# Patient Record
Sex: FEMALE | Race: WHITE | NOT HISPANIC OR LATINO | Employment: FULL TIME | URBAN - METROPOLITAN AREA
[De-identification: names, ages, dates, MRNs, and addresses within clinical notes are randomized per-mention and may not be internally consistent; named-entity substitution may affect disease eponyms.]

---

## 2017-02-02 ENCOUNTER — ALLSCRIPTS OFFICE VISIT (OUTPATIENT)
Dept: OTHER | Facility: OTHER | Age: 27
End: 2017-02-02

## 2017-06-30 ENCOUNTER — ALLSCRIPTS OFFICE VISIT (OUTPATIENT)
Dept: OTHER | Facility: OTHER | Age: 27
End: 2017-06-30

## 2017-07-21 ENCOUNTER — HOSPITAL ENCOUNTER (EMERGENCY)
Facility: HOSPITAL | Age: 27
Discharge: HOME | End: 2017-07-21
Admitting: EMERGENCY MEDICINE
Payer: COMMERCIAL

## 2017-07-21 ENCOUNTER — GENERIC CONVERSION - ENCOUNTER (OUTPATIENT)
Dept: OTHER | Facility: OTHER | Age: 27
End: 2017-07-21

## 2017-07-21 VITALS
DIASTOLIC BLOOD PRESSURE: 59 MMHG | SYSTOLIC BLOOD PRESSURE: 118 MMHG | WEIGHT: 124 LBS | RESPIRATION RATE: 18 BRPM | HEART RATE: 53 BPM | TEMPERATURE: 97.9 F | OXYGEN SATURATION: 100 %

## 2017-07-21 DIAGNOSIS — T19.2XXA FOREIGN BODY IN VAGINA, INITIAL ENCOUNTER: Primary | ICD-10-CM

## 2017-07-21 PROCEDURE — 99283 EMERGENCY DEPT VISIT LOW MDM: CPT

## 2017-09-05 ENCOUNTER — ALLSCRIPTS OFFICE VISIT (OUTPATIENT)
Dept: OTHER | Facility: OTHER | Age: 27
End: 2017-09-05

## 2017-09-05 PROCEDURE — G0145 SCR C/V CYTO,THINLAYER,RESCR: HCPCS | Performed by: OBSTETRICS & GYNECOLOGY

## 2017-09-06 ENCOUNTER — LAB REQUISITION (OUTPATIENT)
Dept: LAB | Facility: HOSPITAL | Age: 27
End: 2017-09-06
Payer: COMMERCIAL

## 2017-09-06 DIAGNOSIS — Z01.419 ENCOUNTER FOR GYNECOLOGICAL EXAMINATION WITHOUT ABNORMAL FINDING: ICD-10-CM

## 2017-09-12 LAB
LAB AP GYN PRIMARY INTERPRETATION: NORMAL
Lab: NORMAL

## 2018-01-10 NOTE — CONSULTS
Chief Complaint  The patient presents to the office for her routine exam       History of Present Illness  GYN , Adult Female [facility]: The patient is being seen for a gynecology evaluation  Social History: She is unmarried  The patient has never smoked cigarettes  She reports occasional alcohol use  General Health: The patient's health since the last visit is described as good  Reproductive health:  she reports no menstrual problems  Menstrual history: LMP: the last menstrual period was 2011  she uses no contraception  she denies prior pregnancies  Screening: Cervical cancer screening includes a pap smear performed 2014  Review of Systems    Constitutional: No fever, no chills, feels well, no tiredness, no recent weight gain or loss  ENT: no ear ache, no loss of hearing, no nosebleeds or nasal discharge, no sore throat or hoarseness  Cardiovascular: no complaints of slow or fast heart rate, no chest pain, no palpitations, no leg claudication or lower extremity edema  Respiratory: no complaints of shortness of breath, no wheezing, no dyspnea on exertion, no orthopnea or PND  Breasts: no complaints of breast pain, breast lump or nipple discharge  Gastrointestinal: no complaints of abdominal pain, no constipation, no nausea or diarrhea, no vomiting, no bloody stools  Genitourinary: no complaints of dysuria, no incontinence, no pelvic pain, no dysmenorrhea, no vaginal discharge or abnormal vaginal bleeding  Musculoskeletal: no complaints of arthralgia, no myalgia, no joint swelling or stiffness, no limb pain or swelling  Integumentary: no complaints of skin rash or lesion, no itching or dry skin, no skin wounds  Neurological: no complaints of headache, no confusion, no numbness or tingling, no dizziness or fainting  Active Problems    1  Breast lump (611 72) (N63)   2  Contraceptives (V25 02)   3  Encounter for routine gynecological examination (V72 31) (Z01 419)   4   Encounter for routine gynecological examination with Papanicolaou smear of cervix   (V72 31,V76 2) (Z01 419)    Surgical History    · Contraceptives (V25 02)    Family History    · No pertinent family history    · No pertinent family history    · Family history of Breast cancer    · Family history of Stomach cancer    Social History    · Being A Social Drinker   · Never A Smoker    Current Meds   1  Depo-SubQ Provera 104 104 MG/0 65ML Subcutaneous Suspension; Inject   subcutaneouly every 12 weeks; Therapy: 98BBU7554 to (Evaluate:09Apr2014)  Requested for: 58SXS8141; Last   [id number] Ordered   2  Depo-SubQ Provera 104 104 MG/0 65ML Subcutaneous Suspension; Inject   subcutaneouly every 12 weeks; Therapy: 08FMG6714 to (Evaluate:98Ukj0799)  Requested for: 70JTT6452; Last   [id number] Ordered   3  Depo-SubQ Provera 104 104 MG/0 65ML Subcutaneous Suspension; Inject   subcutaneously every 12 weeks; Therapy: 27QRG8048 to (Evaluate:23Jan2017)  Requested for: 68Lns6506; Last   Rx:[id number] Ordered    Allergies    1  No Known Drug Allergies    Vitals   Recorded: 54Ryx1403 11:48AM Recorded: 27SLN1286 65:62OQ   Systolic 959 387   Diastolic 68 62   Height 5 ft 4 in 5 ft 4 in   Weight 124 lb 4 oz 120 lb    BMI Calculated 21 33 20 6   BSA Calculated 1 6 1 57   LMP 2011 2011     Physical Exam    Constitutional   General appearance: No acute distress, well appearing and well nourished  Neck   Neck: Normal, supple, trachea midline, no masses  Thyroid: Normal, no thyromegaly  Pulmonary   Respiratory effort: No increased work of breathing or signs of respiratory distress  Auscultation of lungs: Clear to auscultation  Cardiovascular   Auscultation of heart: Normal rate and rhythm, normal S1 and S2, no murmurs  Peripheral vascular exam: Normal pulses Throughout  Genitourinary   External genitalia: Normal and no lesions appreciated  Vagina: Normal, no lesions or dryness appreciated      Urethra: Normal     Urethral meatus: Normal     Bladder: Normal, soft, non-tender and no prolapse or masses appreciated  Cervix: Normal, no palpable masses  Uterus: Normal, non-tender, not enlarged, and no palpable masses  Adnexa/parametria: Normal, non-tender and no fullness or masses appreciated  Anus, perineum, and rectum: Normal sphincter tone, no masses, and no prolapse  Chest   Breasts: Normal and no dimpling or skin changes noted  Abdomen   Abdomen: Normal, non-tender, and no organomegaly noted  Liver and spleen: No hepatomegaly or splenomegaly  Examination for hernias: No hernias appreciated  Lymphatic   Palpation of lymph nodes in neck, axillae, groin and/or other locations: No lymphadenopathy or masses noted  Skin   Skin and subcutaneous tissue: Normal skin turgor and no rashes  Palpation of skin and subcutaneous tissue: Normal     Psychiatric   Orientation to person, place, and time: Normal     Mood and affect: Normal        Assessment    1  Encounter for routine gynecological examination (V72 31) (Z01 419)   2  Encounter for surveillance of injectable contraceptive (V25 49) (Z30 42)    Plan  Contraceptives    · Depo-SubQ Provera 104 104 MG/0 65ML Subcutaneous Suspension   Rx By: Doreen Rivera; Dispense: 90 Days ; #:1 X 0 65 ML Syringe;  Refill: 2; For: Contraceptives; EMANI = N; Verified Transmission to [address] [place] [address]; Last Updated By: Patel Salazar; 2/25/2016 11:50:43 AM  Encounter for routine gynecological examination    · Depo-SubQ Provera 104 104 MG/0 65ML Subcutaneous Suspension   Rx By: Doreen Rivera; Dispense: 84 Days ; #:1 ML; Refill: 3; For: Encounter for routine gynecological examination; EMANI = N; Verified Transmission to [address] [place] [address]; Last Updated By: Patel Salazar; 2/25/2016 11:50:40 AM  Encounter for surveillance of injectable contraceptive    · Follow-up visit in 1 year Evaluation and Treatment  Follow-up  Status: Hold For -  Scheduling  Requested for: 94XOX3772   Ordered; For: Encounter for surveillance of injectable contraceptive; Ordered By: Vinh Hines Performed:  Due: 48TWA0262    Discussion/Summary    Doing well; applying for radiology education at [employer]; working as  @ [employer]; wants to return using injectable ; reviewed potential gyn emergencies; pt  has my cell # prn; rto 1 year ; pt  to resume Depo injections; no menses since October        Future Appointments    Signatures   Electronically signed by : Antwon Cabrera DO; Feb 25 2016 12:14PM EST                       (Author)

## 2018-01-11 NOTE — PROGRESS NOTES
Chief Complaint  Pt presents today for her depo injection given subq in her L arm  Pt declined upt stating she is 100% she is not pregnant and has not had intercourse in 6 months  Pt tolerated injection well and brought her own depo  BD      Active Problems    1  Breast lump (611 72) (N63)   2  Contraceptives (V25 02)   3  Encounter for routine gynecological examination (V72 31) (Z01 419)   4  Encounter for routine gynecological examination with Papanicolaou smear of cervix   (V72 31,V76 2) (Z01 419)   5  Encounter for surveillance of injectable contraceptive (V25 49) (Z30 42)    Current Meds   1  Depo-SubQ Provera 104 104 MG/0 65ML Subcutaneous Suspension; Inject   subcutaneously every 12 weeks; Therapy: 44DGF8352 to (Evaluate:23Jan2017)  Requested for: 84Chg2568; Last   Rx:[id number] Ordered    Allergies    1   No Known Drug Allergies    Plan  Contraceptives    · Depo-SubQ Provera 104 104 MG/0 65ML Subcutaneous Suspension    Future Appointments    Date/Time Provider Specialty Site   06/06/2016 01:30 PM [facility], [facility] Schedule  [facility]     Signatures   Electronically signed by : Lore Moctezuma DO; Mar  3 2016 12:30PM EST                       (Author)

## 2018-01-12 NOTE — MISCELLANEOUS
Message  Message Free Text Note Form: pt  had text sent today and was informed she cannot donate eggs due to suppression of birth control; I called her back; left message and also returned a text for her to inquire length of time off the birth contro; she returned my text and was told to wait a year; I sent text asking reason based on pelvic u s  or blood test      Signatures   Electronically signed by : Corrine Maher DO; Aug 25 2017  3:21PM EST                       (Author)

## 2018-01-13 VITALS
HEIGHT: 64 IN | BODY MASS INDEX: 21.51 KG/M2 | SYSTOLIC BLOOD PRESSURE: 110 MMHG | DIASTOLIC BLOOD PRESSURE: 60 MMHG | WEIGHT: 126 LBS

## 2018-01-13 VITALS
BODY MASS INDEX: 20.66 KG/M2 | DIASTOLIC BLOOD PRESSURE: 60 MMHG | HEIGHT: 65 IN | SYSTOLIC BLOOD PRESSURE: 100 MMHG | WEIGHT: 124 LBS

## 2018-01-13 NOTE — PROGRESS NOTES
Assessment    1  Encounter for routine gynecological examination (V72 31) (Z01 419)   2  Encounter for surveillance of injectable contraceptive (V25 49) (Z30 42)    Plan  Contraceptives    · Depo-SubQ Provera 104 104 MG/0 65ML Subcutaneous Suspension   Rx By: Anna Perea; Dispense: 90 Days ; #:1 X 0 65 ML Syringe; Refill: 2; For: Contraceptives; EMANI = N; Verified Transmission to [address] [place] [address]; Last Updated By: Donta Lacy; 2/25/2016 11:50:43 AM  Encounter for routine gynecological examination    · Depo-SubQ Provera 104 104 MG/0 65ML Subcutaneous Suspension   Rx By: Anna Perea; Dispense: 84 Days ; #:1 ML; Refill: 3; For: Encounter for routine gynecological examination; EMANI = N; Verified Transmission to [address] [place] [address]; Last Updated By: Donta Lacy; 2/25/2016 11:50:40 AM  Encounter for surveillance of injectable contraceptive    · Follow-up visit in 1 year Evaluation and Treatment  Follow-up  Status: Hold For -  Scheduling  Requested for: 11LPS7291   Ordered; For: Encounter for surveillance of injectable contraceptive; Ordered By: Anna Perea Performed:  Due: 11KXG4467    Discussion/Summary    Doing well; applying for radiology education at [employer]; working as  @ [employer]; wants to return using injectable ; reviewed potential gyn emergencies; pt  has my cell # prn; rto 1 year ; pt  to resume Depo injections; no menses since October  Chief Complaint  The patient presents to the office for her routine exam       History of Present Illness  GYN HM, Adult Female [facility]: The patient is being seen for a gynecology evaluation  Social History: She is unmarried  The patient has never smoked cigarettes  She reports occasional alcohol use  General Health: The patient's health since the last visit is described as good  Reproductive health:  she reports no menstrual problems  Menstrual history: LMP: the last menstrual period was 2011   she uses no contraception  she denies prior pregnancies  Screening: Cervical cancer screening includes a pap smear performed 2014  Review of Systems    Constitutional: No fever, no chills, feels well, no tiredness, no recent weight gain or loss  ENT: no ear ache, no loss of hearing, no nosebleeds or nasal discharge, no sore throat or hoarseness  Cardiovascular: no complaints of slow or fast heart rate, no chest pain, no palpitations, no leg claudication or lower extremity edema  Respiratory: no complaints of shortness of breath, no wheezing, no dyspnea on exertion, no orthopnea or PND  Breasts: no complaints of breast pain, breast lump or nipple discharge  Gastrointestinal: no complaints of abdominal pain, no constipation, no nausea or diarrhea, no vomiting, no bloody stools  Genitourinary: no complaints of dysuria, no incontinence, no pelvic pain, no dysmenorrhea, no vaginal discharge or abnormal vaginal bleeding  Musculoskeletal: no complaints of arthralgia, no myalgia, no joint swelling or stiffness, no limb pain or swelling  Integumentary: no complaints of skin rash or lesion, no itching or dry skin, no skin wounds  Neurological: no complaints of headache, no confusion, no numbness or tingling, no dizziness or fainting  Active Problems    1  Breast lump (611 72) (N63)   2  Contraceptives (V25 02)   3  Encounter for routine gynecological examination (V72 31) (Z01 419)   4  Encounter for routine gynecological examination with Papanicolaou smear of cervix   (V72 31,V76 2) (Z01 419)    Surgical History    · Contraceptives (V25 02)    Family History    · No pertinent family history    · No pertinent family history    · Family history of Breast cancer    · Family history of Stomach cancer    Social History    · Being A Social Drinker   · Never A Smoker    Current Meds   1  Depo-SubQ Provera 104 104 MG/0 65ML Subcutaneous Suspension; Inject   subcutaneouly every 12 weeks;    Therapy: 93SGX5593 to (Evaluate:09Apr2014)  Requested for: 47CVB0996; Last   [id number] Ordered   2  Depo-SubQ Provera 104 104 MG/0 65ML Subcutaneous Suspension; Inject   subcutaneouly every 12 weeks; Therapy: 01EEM0818 to (Evaluate:71Owy6181)  Requested for: 43DEP1219; Last   [id number] Ordered   3  Depo-SubQ Provera 104 104 MG/0 65ML Subcutaneous Suspension; Inject   subcutaneously every 12 weeks; Therapy: 38HYC1577 to (Evaluate:23Jan2017)  Requested for: 85Nxl3917; Last   Rx:[id number] Ordered    Allergies    1  No Known Drug Allergies    Vitals   Recorded: 83Hwa1955 11:48AM Recorded: 15GLY5508 56:43IH   Systolic 462 700   Diastolic 68 62   Height 5 ft 4 in 5 ft 4 in   Weight 124 lb 4 oz 120 lb    BMI Calculated 21 33 20 6   BSA Calculated 1 6 1 57   LMP 2011 2011     Physical Exam    Constitutional   General appearance: No acute distress, well appearing and well nourished  Neck   Neck: Normal, supple, trachea midline, no masses  Thyroid: Normal, no thyromegaly  Pulmonary   Respiratory effort: No increased work of breathing or signs of respiratory distress  Auscultation of lungs: Clear to auscultation  Cardiovascular   Auscultation of heart: Normal rate and rhythm, normal S1 and S2, no murmurs  Peripheral vascular exam: Normal pulses Throughout  Genitourinary   External genitalia: Normal and no lesions appreciated  Vagina: Normal, no lesions or dryness appreciated  Urethra: Normal     Urethral meatus: Normal     Bladder: Normal, soft, non-tender and no prolapse or masses appreciated  Cervix: Normal, no palpable masses  Uterus: Normal, non-tender, not enlarged, and no palpable masses  Adnexa/parametria: Normal, non-tender and no fullness or masses appreciated  Anus, perineum, and rectum: Normal sphincter tone, no masses, and no prolapse  Chest   Breasts: Normal and no dimpling or skin changes noted  Abdomen   Abdomen: Normal, non-tender, and no organomegaly noted      Liver and spleen: No hepatomegaly or splenomegaly  Examination for hernias: No hernias appreciated  Lymphatic   Palpation of lymph nodes in neck, axillae, groin and/or other locations: No lymphadenopathy or masses noted  Skin   Skin and subcutaneous tissue: Normal skin turgor and no rashes      Palpation of skin and subcutaneous tissue: Normal     Psychiatric   Orientation to person, place, and time: Normal     Mood and affect: Normal        Future Appointments    Date/Time Provider Specialty Site   03/16/2016 01:30 PM [facility], Nurse Schedule  [facility]     Signatures   Electronically signed by : Nat Duque DO; Feb 25 2016 12:14PM EST                       (Author)

## 2018-01-14 VITALS
HEIGHT: 64 IN | DIASTOLIC BLOOD PRESSURE: 60 MMHG | BODY MASS INDEX: 21.34 KG/M2 | SYSTOLIC BLOOD PRESSURE: 98 MMHG | WEIGHT: 125 LBS

## 2018-01-15 NOTE — PROGRESS NOTES
Chief Complaint  PT presents for her depo injection, given subQ in her right arm, Carly Bonilla [id number] EXP 03/2018 LOT [id number]      Active Problems    1  Breast lump (611 72) (N63)   2  Contraceptives (V25 02)   3  Encounter for routine gynecological examination (V72 31) (Z01 419)   4  Encounter for routine gynecological examination with Papanicolaou smear of cervix   (V72 31,V76 2) (Z01 419)   5  Encounter for surveillance of injectable contraceptive (V25 49) (Z30 42)    Current Meds   1  Depo-SubQ Provera 104 104 MG/0 65ML Subcutaneous Suspension; Inject   subcutaneously every 12 weeks; Therapy: 38KUG4727 to (Evaluate:23Jan2017)  Requested for: 57Jyl6125; Last   Rx:[id number] Ordered    Allergies    1   No Known Drug Allergies    Plan  Contraceptives    · Depo-SubQ Provera 104 104 MG/0 65ML Subcutaneous Suspension    Future Appointments    Date/Time Provider Specialty Site   09/06/2016 10:15 AM [facility]     Signatures   Electronically signed by : Collette Life, DO; Jun 2 2016  3:48PM EST                       (Author)

## 2018-02-26 ENCOUNTER — TELEPHONE (OUTPATIENT)
Dept: OBGYN CLINIC | Facility: MEDICAL CENTER | Age: 28
End: 2018-02-26

## 2018-02-28 DIAGNOSIS — Z00.00 HEALTHCARE MAINTENANCE: Primary | ICD-10-CM

## 2018-03-30 ENCOUNTER — HOSPITAL ENCOUNTER (OUTPATIENT)
Dept: ULTRASOUND IMAGING | Facility: MEDICAL CENTER | Age: 28
Discharge: HOME | End: 2018-03-30
Payer: COMMERCIAL

## 2018-03-30 DIAGNOSIS — Z00.00 HEALTHCARE MAINTENANCE: ICD-10-CM

## 2018-03-30 PROCEDURE — 76856 US EXAM PELVIC COMPLETE: CPT

## 2018-03-30 PROCEDURE — 76830 TRANSVAGINAL US NON-OB: CPT

## 2018-04-04 ENCOUNTER — TELEPHONE (OUTPATIENT)
Dept: OBGYN CLINIC | Facility: MEDICAL CENTER | Age: 28
End: 2018-04-04

## 2018-04-04 NOTE — TELEPHONE ENCOUNTER
I called pt  With results of recent pelvic u s  And had to leave a message advising her to make an apt  For me to discuss and show her the results and also reschedule skye   I  6-12 weeks; presently, may be a contraindication to provide donor eggs

## 2018-04-27 ENCOUNTER — OFFICE VISIT (OUTPATIENT)
Dept: OBGYN CLINIC | Facility: MEDICAL CENTER | Age: 28
End: 2018-04-27
Payer: COMMERCIAL

## 2018-04-27 VITALS
DIASTOLIC BLOOD PRESSURE: 78 MMHG | SYSTOLIC BLOOD PRESSURE: 110 MMHG | BODY MASS INDEX: 23.41 KG/M2 | HEIGHT: 64 IN | WEIGHT: 137.1 LBS

## 2018-04-27 DIAGNOSIS — N83.201 RIGHT OVARIAN CYST: Primary | ICD-10-CM

## 2018-04-27 PROBLEM — N94.9 GENITAL LESION, FEMALE: Status: ACTIVE | Noted: 2017-02-02

## 2018-04-27 PROBLEM — N91.1 AMENORRHEA, SECONDARY: Status: ACTIVE | Noted: 2017-02-02

## 2018-04-27 PROCEDURE — 99214 OFFICE O/P EST MOD 30 MIN: CPT | Performed by: OBSTETRICS & GYNECOLOGY

## 2018-04-27 RX ORDER — ISOTRETINOIN 30 MG/1
CAPSULE, LIQUID FILLED ORAL
COMMUNITY
Start: 2018-04-25 | End: 2018-10-26 | Stop reason: ALTCHOICE

## 2018-04-27 NOTE — PROGRESS NOTES
Pt  Here to discuss recent pelvic u s  ; had shown her actual u s  on computer revealing hemorrhagic cyst of right ovary with recommendation to repeat in 6-12 weeks; pt  Also on acne medication and wishes to start contraception; she prefers the injection, but with the cyst, advised to take 1-3 months of LoLoestrin, repeat pelvic u s   End of May; RTO in 2 months and then may start medroxyprogesterone 150 mg ; pt  Busy as  for [employer]; 25 minute 100 % discussion in presence of Olivier Albarado a  Student; (Rx was given for the repeat pelvic u s )

## 2018-07-16 ENCOUNTER — TELEPHONE (OUTPATIENT)
Dept: OBGYN CLINIC | Facility: MEDICAL CENTER | Age: 28
End: 2018-07-16

## 2018-07-19 ENCOUNTER — TELEPHONE (OUTPATIENT)
Dept: OBGYN CLINIC | Facility: MEDICAL CENTER | Age: 28
End: 2018-07-19

## 2018-07-19 NOTE — TELEPHONE ENCOUNTER
----- Message from Melo Ham sent at 7/19/2018  1:41 PM EDT -----  Pt has [employer] insurance  Provider# is [phone]  SD#[id number]  Effective 5/1/18  For outpt u/s, pt will not need a PA or referral  Insurance will cover the cost at 90% and pt will be responsible for 10%  No deductible and oop does not apply  [facility] Ref# [id number]     Spoke with pt and she is aware   ----- Message -----  From: Melo Ham  Sent: 7/18/2018  10:11 AM  To: [employer] on file is terminated as of 5/1/18  Pt has new insurance  Will be emailing a copy    ----- Message -----  From: Miguel Angel Martinez RN  Sent: 7/16/2018   3:44 PM  To: Melo Ham    Pt  Was given a slip for f/u pelvic ultrasound  States that insurance did not cover initial [address] and wants to know if the f/u would be covered as she does not want to pay out of pocket  Can you please call pt   Directly at [phone]

## 2018-07-20 DIAGNOSIS — Z30.41 ENCOUNTER FOR SURVEILLANCE OF CONTRACEPTIVE PILLS: Primary | ICD-10-CM

## 2018-10-26 ENCOUNTER — ANNUAL EXAM (OUTPATIENT)
Dept: OBGYN CLINIC | Facility: MEDICAL CENTER | Age: 28
End: 2018-10-26
Payer: COMMERCIAL

## 2018-10-26 VITALS
WEIGHT: 144 LBS | HEIGHT: 64 IN | SYSTOLIC BLOOD PRESSURE: 110 MMHG | DIASTOLIC BLOOD PRESSURE: 70 MMHG | BODY MASS INDEX: 24.59 KG/M2

## 2018-10-26 DIAGNOSIS — N83.209 CYST OF OVARY, UNSPECIFIED LATERALITY: ICD-10-CM

## 2018-10-26 DIAGNOSIS — Z01.419 ENCNTR FOR GYN EXAM (GENERAL) (ROUTINE) W/O ABN FINDINGS: Primary | ICD-10-CM

## 2018-10-26 PROCEDURE — 99395 PREV VISIT EST AGE 18-39: CPT | Performed by: OBSTETRICS & GYNECOLOGY

## 2018-10-26 RX ORDER — ISOTRETINOIN 40 MG/1
CAPSULE ORAL
Refills: 0 | COMMUNITY
Start: 2018-09-05 | End: 2018-12-04

## 2018-10-26 NOTE — PROGRESS NOTES
ASSESSMENT & PLAN: Diagnoses and all orders for this visit:    Encntr for gyn exam (general) (routine) w/o abn findings    Cyst of ovary, unspecified laterality  -     US pelvis complete w transvaginal; Future    Other orders  -     AMNESTEEM 40 MG capsule; Discussion/Summary:  Pt  Here for yearly gyn preventive exam; she has stopped oral contraceptive in August, 2018, believes ovarian cysts have returned; Rx gave  For pelvic u s; if OK, wishes to return to using sub-Q Depo-Provera; seen with p kumar  student Donna Mehta    1  Routine well woman exam done today  2  Pap:  The patient's pap is up to date  Pap was not done today  Current ASCCP Guidelines reviewed  3   STD testing was was not done  4   Patient has had her Gardasil vaccination  Recommendations reviewed  5  The following were reviewed in today's visit: breast self exam   6  F/u in 1 year    CC:  Annual Gynecologic Examination    HPI: Saige Jaramillo is a 34 y o  who presents for annual gynecologic examination  She has the following concerns:  none    Health Maintenance:    Patient describes her health as good  The last health maintenance visit was 1 years ago  Patient does not have weight concerns  She exercises 7 days per week with work and gym workouts  She does wear her seatbelt routinely  She does perform regular monthly self breast exams  She does feels safe at home  Patients does not follow a  diet  Patients gets 4 servings of dairy or calcium rich foods a day  Last pap: 1 year ago     Patient Active Problem List   Diagnosis    Amenorrhea, secondary    Genital lesion, female       History reviewed  No pertinent past medical history  History reviewed  No pertinent surgical history  Past OB/Gyn History:  Pt does not have menstrual issues  History of sexually transmitted infection: No   History of abnormal pap smears: No      Patient is currently sexually active      The current method of family planning is condoms most of the time  Family History   Problem Relation Age of Onset    No Known Problems Mother     No Known Problems Father     Stomach cancer Maternal Grandfather     Breast cancer Other        Social History:  Social History     Social History    Marital status: Single     Spouse name: N/A    Number of children: N/A    Years of education: N/A     Occupational History    Not on file  Social History Main Topics    Smoking status: Never Smoker    Smokeless tobacco: Never Used    Alcohol use Yes      Comment: occasionally    Drug use: No    Sexual activity: Yes     Partners: Male     Birth control/ protection: Condom      Comment: birth control counseling     Other Topics Concern    Not on file     Social History Narrative    No narrative on file     Presently lives with family  Patient is currently employed     No Known Allergies      Current Outpatient Prescriptions:     AMNESTEEM 40 MG capsule, , Disp: , Rfl: 0      Review of Systems  Constitutional :no fever, feels well, no tiredness, no recent weight gain or loss  ENT: no ear ache, no loss of hearing, no nosebleeds or nasal discharge, no sore throat or hoarseness  Cardiovascular: no complaints of slow or fast heart beat, no chest pain, no palpitations, no leg claudication or lower extremity edema  Respiratory: no complaints of shortness of shortness of breath, no LOREDO  Breasts:no complaints of breast pain, breast lump, or nipple discharge  Gastrointestinal: no complaints of abdominal pain, constipation, nausea, vomiting, or diarrhea or bloody stools  Genitourinary : no complaints of dysuria, incontinence, pelvic pain, no dysmenorrhea, vaginal discharge or abnormal vaginal bleeding and as noted in HPI  Musculoskeletal: no complaints of arthralgia, no myalgia, no joint swelling or stiffness, no limb pain or swelling    Integumentary: no complaints of skin rash or lesion, itching or dry skin  Neurological: no complaints of headache, no confusion, no numbness or tingling, no dizziness or fainting      Physical Exam:   /70   Ht 5' 4" (1 626 m)   Wt 65 3 kg (144 lb)   LMP 10/20/2018   Breastfeeding? No   BMI 24 72 kg/m²     General: appears stated age, cooperative, alert normal mood and affect   Psychiatric oriented to person, place and time  Mood and affect normal   Neck: normal, supple,trachea midline, no masses  Thyroid: normal, no thyromegaly   Heart: regular rate and rhythm, S1, S2 normal, no murmur, click, rub or gallop   Lungs: clear to auscultation bilaterally, no increased work of breathing or signs of respiratory distress   Breasts: normal, no dimpling or skin changes noted   Abdomen: soft, non-tender, without masses or organomegaly   Vulva: normal , no lesions   Vagina: normal , no lesions or dryness   Urethra: normal   Urethal meatus normal   Bladder Normal, soft, non-tender and no prolapse or masses appreciated   Cervix: normal, no palpable masses    Uterus: normal , non-tender, not enlarged, no palpable masses   Adnexa: normal, non-tender without fullness or masses   Lymphatic Palpation of lymph nodes in neck, axilla, groin and/or other locations: no lymphadenopathy or masses noted   Skin Normal skin turgor and no rashes    Palpation of skin and subcutaneous tissue normal

## 2018-11-07 ENCOUNTER — TELEPHONE (OUTPATIENT)
Dept: OBGYN CLINIC | Facility: MEDICAL CENTER | Age: 28
End: 2018-11-07

## 2018-11-07 ENCOUNTER — HOSPITAL ENCOUNTER (OUTPATIENT)
Dept: ULTRASOUND IMAGING | Facility: MEDICAL CENTER | Age: 28
Discharge: HOME | End: 2018-11-07
Payer: COMMERCIAL

## 2018-11-07 DIAGNOSIS — N83.209 CYST OF OVARY, UNSPECIFIED LATERALITY: ICD-10-CM

## 2018-11-07 PROCEDURE — 76856 US EXAM PELVIC COMPLETE: CPT

## 2018-11-07 PROCEDURE — 76830 TRANSVAGINAL US NON-OB: CPT

## 2018-12-04 ENCOUNTER — OFFICE VISIT (OUTPATIENT)
Dept: OBGYN CLINIC | Facility: MEDICAL CENTER | Age: 28
End: 2018-12-04
Payer: COMMERCIAL

## 2018-12-04 VITALS — BODY MASS INDEX: 24.49 KG/M2 | DIASTOLIC BLOOD PRESSURE: 64 MMHG | SYSTOLIC BLOOD PRESSURE: 108 MMHG | WEIGHT: 142.7 LBS

## 2018-12-04 DIAGNOSIS — Z87.42 HISTORY OF OVARIAN CYST: Primary | ICD-10-CM

## 2018-12-04 PROCEDURE — 99214 OFFICE O/P EST MOD 30 MIN: CPT | Performed by: OBSTETRICS & GYNECOLOGY

## 2018-12-04 NOTE — PROGRESS NOTES
Pt  Here to discuss possibility of donating eggs , but was informed with recent pelvic u s  That no follicles or the ones she has are not large enough to donate; she will have report sent; today is the third day of cycle; she agreed to try medication to stimulate follicle formation; she has had a history of a hemorrhagic cyst which resolved spontaneously; Rx sent for clomiphene to start in two days or she may wait a month to begin, but will need to repeat pelvic u s  After alt least two months of medication and return here for advise  Times spent oin discussionwas at least 25 minutes 100 % counseling  Rx was given for pelvic u s  To be done midcycle prior to apt  Here I  The office

## 2018-12-06 ENCOUNTER — TELEPHONE (OUTPATIENT)
Dept: OBGYN CLINIC | Facility: MEDICAL CENTER | Age: 28
End: 2018-12-06

## 2018-12-06 NOTE — TELEPHONE ENCOUNTER
I called pt  To acknowledge that I received her text about denial for egg donorship and the clinicians comment about the possibility of infertility and the suspicion of Fragle X Syndrome  Did not have a chance to call Gonzalo Truong (nurse?) about the syndrome; number given to me was [phone]

## 2020-02-12 ENCOUNTER — ANNUAL EXAM (OUTPATIENT)
Dept: OBGYN CLINIC | Facility: MEDICAL CENTER | Age: 30
End: 2020-02-12
Payer: COMMERCIAL

## 2020-02-12 VITALS
DIASTOLIC BLOOD PRESSURE: 78 MMHG | HEIGHT: 64 IN | SYSTOLIC BLOOD PRESSURE: 110 MMHG | WEIGHT: 137.7 LBS | BODY MASS INDEX: 23.51 KG/M2

## 2020-02-12 DIAGNOSIS — Z01.419 ENCOUNTER FOR ROUTINE GYNECOLOGICAL EXAMINATION WITH PAPANICOLAOU SMEAR OF CERVIX: Primary | ICD-10-CM

## 2020-02-12 PROCEDURE — G0145 SCR C/V CYTO,THINLAYER,RESCR: HCPCS | Performed by: PATHOLOGY

## 2020-02-12 PROCEDURE — G0124 SCREEN C/V THIN LAYER BY MD: HCPCS | Performed by: PATHOLOGY

## 2020-02-12 PROCEDURE — S0612 ANNUAL GYNECOLOGICAL EXAMINA: HCPCS | Performed by: OBSTETRICS & GYNECOLOGY

## 2020-02-12 NOTE — PROGRESS NOTES
ASSESSMENT & PLAN: Diagnoses and all orders for this visit:    Encounter for routine gynecological examination with Papanicolaou smear of cervix  -     Liquid-based pap, screening     Discussion/Summary:  Patent here for yearly gyn preventive exam ad pap;has new job with [employer]; seen with p a  Student, Donna Burton    1  Routine well woman exam done today  2  Pap:  The patient's pap is not up to date  Pap was done today  Current ASCCP Guidelines reviewed  3   STD testing was not done  4   Patient has had her Gardasil vaccination  Recommendations reviewed  5  The following were reviewed in today's visit: breast self exam, adequate intake of calcium and vitamin D, exercise and healthy diet  6  F/u in 1year  CC:  Annual Gynecologic Examination    HPI: Concepcion Castano is a 34 y o  who presents for annual gynecologic examination  She has the following concerns:  none    Health Maintenance:    Patient describes her health as good  The last health maintenance visit was 1 year ago  Patient does not have weight concerns  She exercises 7 days per week with work and workouts  She does wear her seatbelt routinely  She does perform regular monthly self breast exams  She does feels safe at home  Patients does not follow a  diet  Patients gets 4 servings of dairy or calcium rich foods a day  Last pap: 3 years ago     Patient Active Problem List   Diagnosis    Amenorrhea, secondary    Genital lesion, female       No past medical history on file  No past surgical history on file  Past OB/Gyn History:  Pt does not have menstrual issues  History of sexually transmitted infection: No   History of abnormal pap smears: No      Patient is not currently sexually active    The current method of family planning is none      Family History   Problem Relation Age of Onset    No Known Problems Mother     No Known Problems Father     Stomach cancer Maternal Grandfather     Breast cancer Other        Social History:  Social History     Socioeconomic History    Marital status: Single     Spouse name: Not on file    Number of children: Not on file    Years of education: Not on file    Highest education level: Not on file   Occupational History    Not on file   Social Needs    Financial resource strain: Not on file    Food insecurity:     Worry: Not on file     Inability: Not on file    Transportation needs:     Medical: Not on file     Non-medical: Not on file   Tobacco Use    Smoking status: Never Smoker    Smokeless tobacco: Never Used   Substance and Sexual Activity    Alcohol use: Yes     Comment: occasionally    Drug use: No    Sexual activity: Yes     Partners: Male     Birth control/protection: Condom   Lifestyle    Physical activity:     Days per week: Not on file     Minutes per session: Not on file    Stress: Not on file   Relationships    Social connections:     Talks on phone: Not on file     Gets together: Not on file     Attends Cheondoism service: Not on file     Active member of club or organization: Not on file     Attends meetings of clubs or organizations: Not on file     Relationship status: Not on file    Intimate partner violence:     Fear of current or ex partner: Not on file     Emotionally abused: Not on file     Physically abused: Not on file     Forced sexual activity: Not on file   Other Topics Concern    Not on file   Social History Narrative    Not on file     Presently lives with self  Patient is currently employed     No Known Allergies    No current outpatient medications on file  Review of Systems  Constitutional :no fever, feels well, no tiredness, no recent weight gain or loss  ENT: no ear ache, no loss of hearing, no nosebleeds or nasal discharge, no sore throat or hoarseness  Cardiovascular: no complaints of slow or fast heart beat, no chest pain, no palpitations, no leg claudication or lower extremity edema    Respiratory: no complaints of shortness of shortness of breath, no LOREDO  Breasts:no complaints of breast pain, breast lump, or nipple discharge  Gastrointestinal: no complaints of abdominal pain, constipation, nausea, vomiting, or diarrhea or bloody stools  Genitourinary : no complaints of dysuria, incontinence, pelvic pain, no dysmenorrhea, vaginal discharge or abnormal vaginal bleeding and as noted in HPI  Musculoskeletal: no complaints of arthralgia, no myalgia, no joint swelling or stiffness, no limb pain or swelling  Integumentary: no complaints of skin rash or lesion, itching or dry skin  Neurological: no complaints of headache, no confusion, no numbness or tingling, no dizziness or fainting      Physical Exam:   /78   Ht 5' 4" (1 626 m)   Wt 62 5 kg (137 lb 11 2 oz)   LMP 02/04/2020   Breastfeeding No   BMI 23 64 kg/m²     General: appears stated age, cooperative, alert normal mood and affect   Psychiatric oriented to person, place and time  Mood and affect normal   Neck: normal, supple,trachea midline, no masses  Thyroid: normal, no thyromegaly   Heart: regular rate and rhythm, S1, S2 normal, no murmur, click, rub or gallop   Lungs: clear to auscultation bilaterally, no increased work of breathing or signs of respiratory distress   Breasts: normal, no dimpling or skin changes noted   Abdomen: soft, non-tender, without masses or organomegaly   Vulva: normal , no lesions   Vagina: normal , no lesions or dryness   Urethra: normal   Urethal meatus normal   Bladder Normal, soft, non-tender and no prolapse or masses appreciated   Cervix: normal, no palpable masses ; ec and c pap done   Uterus: normal , non-tender, not enlarged, no palpable masses   Adnexa: normal, non-tender without fullness or masses   Lymphatic Palpation of lymph nodes in neck, axilla, groin and/or other locations: no lymphadenopathy or masses noted   Skin Normal skin turgor and no rashes    Palpation of skin and subcutaneous tissue normal

## 2020-02-19 ENCOUNTER — TELEPHONE (OUTPATIENT)
Dept: OBGYN CLINIC | Facility: MEDICAL CENTER | Age: 30
End: 2020-02-19

## 2020-02-19 LAB
LAB AP GYN PRIMARY INTERPRETATION: ABNORMAL
LAB AP LMP: ABNORMAL
Lab: ABNORMAL
PATH INTERP SPEC-IMP: ABNORMAL

## 2020-02-19 NOTE — TELEPHONE ENCOUNTER
I called and explained result of pap smear and the need for a colposcopy  She will call tomorrow for an apt

## 2020-03-10 ENCOUNTER — PROCEDURE VISIT (OUTPATIENT)
Dept: OBGYN CLINIC | Facility: MEDICAL CENTER | Age: 30
End: 2020-03-10
Payer: COMMERCIAL

## 2020-03-10 VITALS
DIASTOLIC BLOOD PRESSURE: 78 MMHG | SYSTOLIC BLOOD PRESSURE: 128 MMHG | BODY MASS INDEX: 23.05 KG/M2 | HEIGHT: 64 IN | WEIGHT: 135 LBS

## 2020-03-10 DIAGNOSIS — R87.613 HGSIL (HIGH GRADE SQUAMOUS INTRAEPITHELIAL LESION) ON PAP SMEAR OF CERVIX: Primary | ICD-10-CM

## 2020-03-10 PROCEDURE — 88305 TISSUE EXAM BY PATHOLOGIST: CPT | Performed by: PATHOLOGY

## 2020-03-10 PROCEDURE — 87623 HPV LOW-RISK TYPES: CPT | Performed by: OBSTETRICS & GYNECOLOGY

## 2020-03-10 PROCEDURE — 87624 HPV HI-RISK TYP POOLED RSLT: CPT | Performed by: OBSTETRICS & GYNECOLOGY

## 2020-03-10 PROCEDURE — 88344 IMHCHEM/IMCYTCHM EA MLT ANTB: CPT | Performed by: PATHOLOGY

## 2020-03-10 PROCEDURE — 57454 BX/CURETT OF CERVIX W/SCOPE: CPT | Performed by: OBSTETRICS & GYNECOLOGY

## 2020-03-10 NOTE — PROGRESS NOTES
Colposcopy  Date/Time: 3/10/2020 2:44 PM  Performed by: Darleen Han DO  Authorized by: Darleen Han DO     Consent:     Consent obtained:  Verbal and written    Consent given by:  Spouse and patient    Procedural risks discussed:  Bleeding, damage to other organs, death, failure rate, possible continued pain, possible conversion to open surgery, possible loss of function, repeat procedure and infection    Patient questions answered: yes      Patient agrees, verbalizes understanding, and wants to proceed: yes      Educational handouts given: yes      Instructions and paperwork completed: yes    Pre-procedure:     Pre-procedure timeout performed: yes      Premeds:  Ibuprofen    Prepped with: acetic acid and Lugol    Indication:     Indication:  HSIL  Procedure:     Procedure: Colposcopy w/ cervical biopsy and ECC      Under satisfactory analgesia the patient was prepped and draped in the dorsal lithotomy position: yes      Laotto speculum was placed in the vagina: yes      Under colposcopic examination the transition zone was seen in entirety: yes      Intracervical block was performed: no      Endocervix was curetted using a Kevorkian curette: yes      Cervical biopsy performed with a cervical biopsy punch: yes      Tampon inserted: no      Monsel's solution was applied: yes      Biopsy(s): yes      Location:  6 and 12 O'clock    Specimen to pathology: yes    Post-procedure:     Findings: Friable cervix and Mosaicism      Impression: High grade cervical dysplasia      Patient tolerance of procedure: Tolerated well, no immediate complications  Comments:      6 O'clock had blood vessels; 12 O'clock mosaic pattern; assisting me was [employer], CMA; minimal blood loss; advised here not to use tampon with next menses  Also, advised NO vaginal activity for at least 4 weeks  Patient education pamphlet explained and given to her prior to the colposcopy   Pamphlet "Abnormal Cervical Cancer Screening Test Results" (ACOG)

## 2020-03-14 LAB
HPV I/H RISK 1 DNA CVX QL PROBE+SIG AMP: POSITIVE
HPV LOW RISK DNA CVX QL PROBE+SIG AMP: NEGATIVE

## 2020-03-17 ENCOUNTER — TELEPHONE (OUTPATIENT)
Dept: OBGYN CLINIC | Facility: MEDICAL CENTER | Age: 30
End: 2020-03-17

## 2020-03-17 NOTE — TELEPHONE ENCOUNTER
I called patient ad explained pathology report and advised to have cryocautery on 04/07/2020  She agreed

## 2020-03-31 ENCOUNTER — OFFICE VISIT (OUTPATIENT)
Dept: OBGYN CLINIC | Facility: MEDICAL CENTER | Age: 30
End: 2020-03-31
Payer: COMMERCIAL

## 2020-03-31 VITALS
BODY MASS INDEX: 24.07 KG/M2 | WEIGHT: 141 LBS | HEIGHT: 64 IN | DIASTOLIC BLOOD PRESSURE: 60 MMHG | SYSTOLIC BLOOD PRESSURE: 110 MMHG

## 2020-03-31 DIAGNOSIS — R87.613 HSIL (HIGH GRADE SQUAMOUS INTRAEPITHELIAL LESION) ON PAP SMEAR OF CERVIX: Primary | ICD-10-CM

## 2020-03-31 DIAGNOSIS — R87.810 CERVICAL HIGH RISK HPV (HUMAN PAPILLOMAVIRUS) TEST POSITIVE: ICD-10-CM

## 2020-03-31 DIAGNOSIS — N87.0 CIN I (CERVICAL INTRAEPITHELIAL NEOPLASIA I): ICD-10-CM

## 2020-03-31 DIAGNOSIS — N72 ACUTE CERVICITIS: ICD-10-CM

## 2020-03-31 PROCEDURE — 57511 CRYOCAUTERY OF CERVIX: CPT | Performed by: OBSTETRICS & GYNECOLOGY

## 2020-03-31 NOTE — PROGRESS NOTES
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, HERBERT I, acute cervicitis; post op: same; Surgeon: Della Guerrero  Assistant: Norman Martinez CMA   Procedure: Prior to procedure, reviewed pathology report of colposcopy and biopsy of cervix, included was giving her pamphlet entitled "Cryocautery" (Ramirez Nieves)  We reviewed each page, highlighting potential side effects and post-op precautions; consent signed after answering her questions  Long sterile Carmita speculum inserted into vagina with partial glove enclosoing the speculum to prevent any freezing of lateral vaginal walls  First freeze with thinnest tip was 3 5 minutes; then, 15 minute thaw, followed by another 3 5 minute freeze using rond shaped tip  Patient tolerated procedure well; she had been given 600 mg  Ibuprofen by mouth 15 minutes prior to the procedure  After procedure, had her sit in my office for at least ten minutes; having no side effects, her one question was how soon can she ride her 8 y o  [place], [address]  Advised to her to wait at least a week  If any issues, she has my office and cell number to text/call  To return to office in two months for recheck and possible pap/HPV

## 2020-08-31 ENCOUNTER — TELEPHONE (OUTPATIENT)
Dept: OBGYN CLINIC | Facility: CLINIC | Age: 30
End: 2020-08-31

## 2020-08-31 NOTE — TELEPHONE ENCOUNTER
The patient called and stated that she spoke with you on Saturday 8/29 concerning birth control  She would like to have a prescription sent when you have the time

## 2020-09-01 ENCOUNTER — TELEPHONE (OUTPATIENT)
Dept: OBGYN CLINIC | Facility: MEDICAL CENTER | Age: 30
End: 2020-09-01

## 2020-09-01 DIAGNOSIS — Z30.011 ENCOUNTER FOR INITIAL PRESCRIPTION OF CONTRACEPTIVE PILLS: Primary | ICD-10-CM

## 2020-09-01 DIAGNOSIS — Z30.011 ENCOUNTER FOR INITIAL PRESCRIPTION OF CONTRACEPTIVE PILLS: ICD-10-CM

## 2020-09-01 NOTE — TELEPHONE ENCOUNTER
[facility] for generic equivalent of LoOvral  Patient notied by text

## 2021-01-13 DIAGNOSIS — Z30.011 ENCOUNTER FOR INITIAL PRESCRIPTION OF CONTRACEPTIVE PILLS: ICD-10-CM

## 2021-01-13 RX ORDER — NORGESTREL-ETHINYL ESTRADIOL 0.3-0.03MG
TABLET ORAL
Qty: 84 TABLET | Refills: 0 | Status: SHIPPED | OUTPATIENT
Start: 2021-01-13 | End: 2021-04-02 | Stop reason: SDUPTHER

## 2021-01-14 ENCOUNTER — TELEPHONE (OUTPATIENT)
Dept: OBGYN CLINIC | Facility: MEDICAL CENTER | Age: 31
End: 2021-01-14

## 2021-04-02 DIAGNOSIS — Z30.011 ENCOUNTER FOR INITIAL PRESCRIPTION OF CONTRACEPTIVE PILLS: ICD-10-CM

## 2021-04-02 RX ORDER — NORGESTREL AND ETHINYL ESTRADIOL 0.3-0.03MG
KIT ORAL
Qty: 84 TABLET | Refills: 0 | Status: SHIPPED | OUTPATIENT
Start: 2021-04-02 | End: 2021-04-05 | Stop reason: SDUPTHER

## 2021-04-05 ENCOUNTER — TELEPHONE (OUTPATIENT)
Dept: OBGYN CLINIC | Facility: MEDICAL CENTER | Age: 31
End: 2021-04-05

## 2021-04-05 DIAGNOSIS — Z30.011 ENCOUNTER FOR INITIAL PRESCRIPTION OF CONTRACEPTIVE PILLS: ICD-10-CM

## 2021-04-05 RX ORDER — NORGESTREL AND ETHINYL ESTRADIOL 0.3-0.03MG
1 KIT ORAL DAILY
Qty: 28 TABLET | Refills: 0 | Status: SHIPPED | OUTPATIENT
Start: 2021-04-05 | End: 2021-04-16 | Stop reason: SDUPTHER

## 2021-04-05 NOTE — TELEPHONE ENCOUNTER
Left msg to return pt call to reschedule appt and to transfer rx to new pharmacy, asked patient to contact office

## 2021-04-05 NOTE — TELEPHONE ENCOUNTER
Pt needs new script for low-orgestrel to be sent to a new pharmacy   Please Review    [facility]  [address], [address]  [facility]

## 2021-04-16 ENCOUNTER — ANNUAL EXAM (OUTPATIENT)
Dept: OBGYN CLINIC | Facility: MEDICAL CENTER | Age: 31
End: 2021-04-16
Payer: COMMERCIAL

## 2021-04-16 VITALS
SYSTOLIC BLOOD PRESSURE: 108 MMHG | BODY MASS INDEX: 22.36 KG/M2 | HEIGHT: 64 IN | DIASTOLIC BLOOD PRESSURE: 60 MMHG | WEIGHT: 131 LBS

## 2021-04-16 DIAGNOSIS — Z30.011 ENCOUNTER FOR INITIAL PRESCRIPTION OF CONTRACEPTIVE PILLS: ICD-10-CM

## 2021-04-16 DIAGNOSIS — Z11.3 SCREENING EXAMINATION FOR STD (SEXUALLY TRANSMITTED DISEASE): ICD-10-CM

## 2021-04-16 DIAGNOSIS — Z01.419 ENCOUNTER FOR GYNECOLOGICAL EXAMINATION: Primary | ICD-10-CM

## 2021-04-16 DIAGNOSIS — B37.3 VAGINAL CANDIDIASIS: ICD-10-CM

## 2021-04-16 DIAGNOSIS — Z01.419 ENCOUNTER FOR ROUTINE GYNECOLOGICAL EXAMINATION WITH PAPANICOLAOU SMEAR OF CERVIX: ICD-10-CM

## 2021-04-16 PROCEDURE — 87591 N.GONORRHOEAE DNA AMP PROB: CPT | Performed by: STUDENT IN AN ORGANIZED HEALTH CARE EDUCATION/TRAINING PROGRAM

## 2021-04-16 PROCEDURE — G0145 SCR C/V CYTO,THINLAYER,RESCR: HCPCS | Performed by: STUDENT IN AN ORGANIZED HEALTH CARE EDUCATION/TRAINING PROGRAM

## 2021-04-16 PROCEDURE — 87624 HPV HI-RISK TYP POOLED RSLT: CPT | Performed by: STUDENT IN AN ORGANIZED HEALTH CARE EDUCATION/TRAINING PROGRAM

## 2021-04-16 PROCEDURE — 87491 CHLMYD TRACH DNA AMP PROBE: CPT | Performed by: STUDENT IN AN ORGANIZED HEALTH CARE EDUCATION/TRAINING PROGRAM

## 2021-04-16 PROCEDURE — 99385 PREV VISIT NEW AGE 18-39: CPT | Performed by: STUDENT IN AN ORGANIZED HEALTH CARE EDUCATION/TRAINING PROGRAM

## 2021-04-16 RX ORDER — FLUCONAZOLE 150 MG/1
150 TABLET ORAL ONCE
Qty: 1 TABLET | Refills: 0 | Status: SHIPPED | OUTPATIENT
Start: 2021-04-16 | End: 2021-04-16

## 2021-04-16 RX ORDER — NORGESTREL AND ETHINYL ESTRADIOL 0.3-0.03MG
1 KIT ORAL DAILY
Qty: 84 TABLET | Refills: 3 | Status: SHIPPED | OUTPATIENT
Start: 2021-04-16 | End: 2022-03-24

## 2021-04-16 NOTE — PROGRESS NOTES
[facility] [address] [place] [address]    ASSESSMENT/PLAN: Rona Tarango is a 31 y o  Edgardo Ivory who presents for annual gynecologic exam     Encounter for routine gynecologic examination  - Routine well woman exam completed today  - Cervical Cancer Screening: Current ASCCP Guidelines reviewed  Last Pap: 02/12/2020 HSIL HPV16 followed by low grade colpo biopsies and cryotherapy with Dr Zoe Sullivan  Pap collected today with co-testing   - HPV Vaccination status: Not immunized  - STI screening offered including HIV testing: Requests GC Chlamydia today  - Contraceptive counseling discussed  Current contraception: condoms or combination OCPs  Additional problems addressed during this visit:  1  Encounter for gynecological examination    2  Encounter for initial prescription of contraceptive pills  -     norgestrel-ethinyl estradiol (Low-Ogestrel) 0 3 mg-30 mcg per tablet; Take 1 tablet by mouth daily    3  Vaginal candidiasis  -     fluconazole (DIFLUCAN) 150 mg tablet; Take 1 tablet (150 mg total) by mouth once for 1 dose  -     terconazole (TERAZOL 3) 0 8 % vaginal cream; Insert 1 applicator into the vagina daily at bedtime        CC:  Annual Gynecologic Examination    HPI: Rona Tarango is a 31 y o  Edgardo Ivory who presents for annual gynecologic examination  She reports no new changes to her health  She reports no breast concerns  She gets regular periods on combined OCP  She has a bothersome new vaginal discharge for 4 days  Denies vulvar or vaginal lesions, pelvic pain, or abnormal bleeding  She has no sexual health concerns and is currently sexually active with one male partner  She contracepts with combined OCPs  She has no symptoms of pelvic organ prolapse, urinary, or fecal incontinence  She denies intimate partner violence          The following portions of the patient's history were reviewed and updated as appropriate: allergies, current medications, past family history, past medical history, obstetric history, gynecologic history, past social history, past surgical history and problem list     Review of Systems   Constitutional: Negative for chills and fever  Respiratory: Negative for cough and shortness of breath  Cardiovascular: Negative for chest pain and leg swelling  Gastrointestinal: Negative for abdominal pain, nausea and vomiting  Genitourinary: Negative for dysuria, frequency and urgency  Neurological: Negative for dizziness, light-headedness and headaches  Objective:  /60   Ht 5' 4" (1 626 m)   Wt 59 4 kg (131 lb)   LMP 04/08/2021 (Approximate)   BMI 22 49 kg/m²    Physical Exam  Exam conducted with a chaperone present  Constitutional:       Appearance: Normal appearance  HENT:      Head: Normocephalic and atraumatic  Cardiovascular:      Rate and Rhythm: Normal rate  Pulmonary:      Effort: Pulmonary effort is normal    Chest:      Breasts: Breasts are symmetrical          Right: Normal  No swelling, bleeding, inverted nipple, mass, nipple discharge, skin change or tenderness  Left: Normal  No swelling, bleeding, inverted nipple, mass, nipple discharge, skin change or tenderness  Abdominal:      General: There is no distension  Tenderness: There is no abdominal tenderness  There is no guarding  Genitourinary:     Exam position: Lithotomy position  Pubic Area: No rash  Labia:         Right: No rash, tenderness or lesion  Left: No rash, tenderness or lesion  Urethra: No prolapse, urethral swelling or urethral lesion  Vagina: Vaginal discharge (Clumpy white) present  No erythema, tenderness, bleeding or lesions  Cervix: No cervical motion tenderness, discharge, friability or erythema  Uterus: Not enlarged, not tender and no uterine prolapse  Adnexa:         Right: No mass, tenderness or fullness  Left: No mass, tenderness or fullness       Lymphadenopathy: Upper Body:      Right upper body: No axillary adenopathy  Left upper body: No axillary adenopathy  Lower Body: No right inguinal adenopathy  No left inguinal adenopathy  Neurological:      Mental Status: She is alert             Kayy Haynes MD  [address] [place] [address]  4/16/2021 2:59 PM

## 2021-04-19 LAB
HPV HR 12 DNA CVX QL NAA+PROBE: NEGATIVE
HPV16 DNA CVX QL NAA+PROBE: NEGATIVE
HPV18 DNA CVX QL NAA+PROBE: NEGATIVE

## 2021-04-20 ENCOUNTER — TELEPHONE (OUTPATIENT)
Dept: OBGYN CLINIC | Facility: MEDICAL CENTER | Age: 31
End: 2021-04-20

## 2021-04-20 DIAGNOSIS — A74.9 CHLAMYDIA: Primary | ICD-10-CM

## 2021-04-20 LAB
C TRACH DNA SPEC QL NAA+PROBE: POSITIVE
N GONORRHOEA DNA SPEC QL NAA+PROBE: NEGATIVE

## 2021-04-20 RX ORDER — AZITHROMYCIN 500 MG/1
1000 TABLET, FILM COATED ORAL ONCE
Qty: 2 TABLET | Refills: 0 | Status: SHIPPED | OUTPATIENT
Start: 2021-04-20 | End: 2021-04-20

## 2021-04-20 NOTE — TELEPHONE ENCOUNTER
TELEPHONE CALL  [facility] for patient to return call  Alerted her that one her test was positive (Chlamydia) and that she may call the office and speak to the nurse or look on MyChart  I sent the appropriate treatment Azithromycin 1 gram x 1 PO to her pharmacy      Micheal Moraes MD  [facility]  04/20/21 11:20 AM

## 2021-04-22 LAB
LAB AP GYN PRIMARY INTERPRETATION: NORMAL
LAB AP LMP: NORMAL
Lab: NORMAL
PATH INTERP SPEC-IMP: NORMAL

## 2021-11-03 ENCOUNTER — OFFICE VISIT (OUTPATIENT)
Dept: OBGYN CLINIC | Facility: MEDICAL CENTER | Age: 31
End: 2021-11-03
Payer: COMMERCIAL

## 2021-11-03 VITALS
HEIGHT: 64 IN | WEIGHT: 126 LBS | SYSTOLIC BLOOD PRESSURE: 120 MMHG | DIASTOLIC BLOOD PRESSURE: 70 MMHG | BODY MASS INDEX: 21.51 KG/M2

## 2021-11-03 DIAGNOSIS — N89.8 VAGINAL DISCHARGE: ICD-10-CM

## 2021-11-03 DIAGNOSIS — Z71.85 VACCINE COUNSELING: ICD-10-CM

## 2021-11-03 DIAGNOSIS — Z11.3 ROUTINE SCREENING FOR STI (SEXUALLY TRANSMITTED INFECTION): ICD-10-CM

## 2021-11-03 DIAGNOSIS — Z30.09 FAMILY PLANNING COUNSELING: ICD-10-CM

## 2021-11-03 DIAGNOSIS — B37.3 VULVOVAGINAL CANDIDIASIS: Primary | ICD-10-CM

## 2021-11-03 PROCEDURE — 99214 OFFICE O/P EST MOD 30 MIN: CPT | Performed by: STUDENT IN AN ORGANIZED HEALTH CARE EDUCATION/TRAINING PROGRAM

## 2021-11-03 PROCEDURE — 87491 CHLMYD TRACH DNA AMP PROBE: CPT | Performed by: STUDENT IN AN ORGANIZED HEALTH CARE EDUCATION/TRAINING PROGRAM

## 2021-11-03 PROCEDURE — 87591 N.GONORRHOEAE DNA AMP PROB: CPT | Performed by: STUDENT IN AN ORGANIZED HEALTH CARE EDUCATION/TRAINING PROGRAM

## 2021-11-03 RX ORDER — FLUCONAZOLE 150 MG/1
150 TABLET ORAL
Qty: 2 TABLET | Refills: 0 | Status: SHIPPED | OUTPATIENT
Start: 2021-11-03 | End: 2021-11-07

## 2021-11-05 LAB
C TRACH DNA SPEC QL NAA+PROBE: NEGATIVE
N GONORRHOEA DNA SPEC QL NAA+PROBE: NEGATIVE

## 2021-11-23 ENCOUNTER — TELEPHONE (OUTPATIENT)
Dept: OBGYN CLINIC | Facility: MEDICAL CENTER | Age: 31
End: 2021-11-23

## 2021-12-01 ENCOUNTER — TELEPHONE (OUTPATIENT)
Dept: OBGYN CLINIC | Facility: MEDICAL CENTER | Age: 31
End: 2021-12-01

## 2022-03-24 DIAGNOSIS — Z30.011 ENCOUNTER FOR INITIAL PRESCRIPTION OF CONTRACEPTIVE PILLS: ICD-10-CM

## 2022-03-24 RX ORDER — NORGESTREL AND ETHINYL ESTRADIOL 0.3-0.03MG
KIT ORAL
Qty: 84 TABLET | Refills: 3 | Status: SHIPPED | OUTPATIENT
Start: 2022-03-24

## 2023-01-04 ENCOUNTER — OFFICE VISIT (OUTPATIENT)
Dept: OBGYN CLINIC | Facility: MEDICAL CENTER | Age: 33
End: 2023-01-04

## 2023-01-04 VITALS
SYSTOLIC BLOOD PRESSURE: 125 MMHG | HEIGHT: 63 IN | WEIGHT: 125.2 LBS | DIASTOLIC BLOOD PRESSURE: 76 MMHG | BODY MASS INDEX: 22.18 KG/M2

## 2023-01-04 DIAGNOSIS — S30.824A: Primary | ICD-10-CM

## 2023-01-04 DIAGNOSIS — L08.9: Primary | ICD-10-CM

## 2023-01-04 RX ORDER — CEPHALEXIN 250 MG/1
250 CAPSULE ORAL EVERY 6 HOURS SCHEDULED
Qty: 28 CAPSULE | Refills: 0 | Status: SHIPPED | OUTPATIENT
Start: 2023-01-04 | End: 2023-01-11

## 2023-01-04 RX ORDER — LIDOCAINE 50 MG/G
OINTMENT TOPICAL AS NEEDED
Qty: 30 G | Refills: 0 | Status: SHIPPED | OUTPATIENT
Start: 2023-01-04

## 2023-01-04 NOTE — PROGRESS NOTES
Assessment Chantelle Marques was seen today for vaginal pain  Diagnoses and all orders for this visit:    Blister of vulva with infection, initial encounter  -     cephalexin (KEFLEX) 250 mg capsule; Take 1 capsule (250 mg total) by mouth every 6 (six) hours for 7 days  -     lidocaine (XYLOCAINE) 5 % ointment; Apply topically as needed for mild pain         Plan  Herpes culture taken of the blisters  Patient given prescription for Keflex x7 days  Patient also given lidocaine ointment for the discomfort  Patient has a yearly appointment in 2 weeks at which time, we can reassess the area  All questions answered  Subjective   Brittney Guzman is a 29 y o  female G0 here for a problem visit  Patient thinks she got a cut from shaving  Sx's started 4 days ago  Pt had initial pain which has gotten better  Has been using coconut oil, femicare since she thought it was warts  There was blistering and instant scabbing  Started her menses today  Hasn't been able to wear underwear  Also had odor at the beginning  Stopped birth control pills x 9 months  Uses condoms  Boyfriend is in the [employer] and has only been intimate twice  Patient Active Problem List   Diagnosis   • Amenorrhea, secondary   • Genital lesion, female   • Vaginal discharge   • Family planning counseling   • Vaccine counseling       Gynecologic History  Patient's last menstrual period was 01/04/2023  The current method of family planning is condoms  No past medical history on file  No past surgical history on file    Family History   Problem Relation Age of Onset   • No Known Problems Mother    • No Known Problems Father    • Stomach cancer Maternal Grandfather    • Breast cancer Other      Social History     Socioeconomic History   • Marital status: Single     Spouse name: Not on file   • Number of children: Not on file   • Years of education: Not on file   • Highest education level: Not on file   Occupational History   • Not on file   Tobacco Use   • Smoking status: Never   • Smokeless tobacco: Never   Substance and Sexual Activity   • Alcohol use: Yes     Comment: occasionally   • Drug use: No   • Sexual activity: Not Currently     Partners: Male     Birth control/protection: Condom, OCP   Other Topics Concern   • Not on file   Social History Narrative   • Not on file     Social Determinants of Health     Financial Resource Strain: Not on file   Food Insecurity: Not on file   Transportation Needs: Not on file   Physical Activity: Not on file   Stress: Not on file   Social Connections: Not on file   Intimate Partner Violence: Not on file   Housing Stability: Not on file     No Known Allergies    Current Outpatient Medications:   •  cephalexin (KEFLEX) 250 mg capsule, Take 1 capsule (250 mg total) by mouth every 6 (six) hours for 7 days, Disp: 28 capsule, Rfl: 0  •  lidocaine (XYLOCAINE) 5 % ointment, Apply topically as needed for mild pain, Disp: 30 g, Rfl: 0  •  Low-Ogestrel 0 3-30 MG-MCG per tablet, TAKE 1 TABLET BY MOUTH EVERY DAY (Patient not taking: Reported on 1/4/2023), Disp: 84 tablet, Rfl: 3  •  terconazole (TERAZOL 3) 0 8 % vaginal cream, Insert 1 applicator into the vagina daily at bedtime (Patient not taking: Reported on 1/4/2023), Disp: 20 g, Rfl: 0    Review of Systems  Constitutional :no fever, feels well, no tiredness, no recent weight gain or loss  ENT: no ear ache, no loss of hearing, no nosebleeds or nasal discharge, no sore throat or hoarseness  Cardiovascular: no complaints of slow or fast heart beat, no chest pain, no palpitations, no leg claudication or lower extremity edema    Respiratory: no complaints of shortness of shortness of breath, no LOREDO  Breasts:no complaints of breast pain, breast lump, or nipple discharge  Gastrointestinal: no complaints of abdominal pain, constipation, nausea, vomiting, or diarrhea or bloody stools  Genitourinary : no complaints of dysuria, incontinence, pelvic pain, no dysmenorrhea, vaginal discharge or abnormal vaginal bleeding and as noted in HPI  Musculoskeletal: no complaints of arthralgia, no myalgia, no joint swelling or stiffness, no limb pain or swelling  Integumentary: no complaints of skin rash or lesion, itching or dry skin  Neurological: no complaints of headache, no confusion, no numbness or tingling, no dizziness or fainting     Objective     /76   Ht 5' 3" (1 6 m)   Wt 56 8 kg (125 lb 3 2 oz)   LMP 01/04/2023   BMI 22 18 kg/m²     General Appears stated age, cooperative, alert normal mood and affect   Psychiatric oriented to person, place and time  Mood and affect normal   Vulva: Left side of mons in region of pubic area, patch of multiple blisters in an area 2x2 cm  No open sores and most blisters appear to be healing over    Two spots with small head noted

## 2023-01-05 LAB
HSV1 DNA SPEC QL NAA+PROBE: DETECTED
HSV1 DNA SPEC QL NAA+PROBE: NOT DETECTED

## 2023-01-06 ENCOUNTER — TELEPHONE (OUTPATIENT)
Dept: OBGYN CLINIC | Facility: MEDICAL CENTER | Age: 33
End: 2023-01-06

## 2023-01-06 DIAGNOSIS — B00.1 HERPES LABIALIS: Primary | ICD-10-CM

## 2023-01-06 DIAGNOSIS — B00.9 HSV (HERPES SIMPLEX VIRUS) INFECTION: Primary | ICD-10-CM

## 2023-01-06 RX ORDER — VALACYCLOVIR HYDROCHLORIDE 1 G/1
1000 TABLET, FILM COATED ORAL 2 TIMES DAILY
Qty: 20 TABLET | Refills: 0 | Status: SHIPPED | OUTPATIENT
Start: 2023-01-06 | End: 2023-01-16

## 2023-01-06 NOTE — TELEPHONE ENCOUNTER
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

## 2023-01-06 NOTE — RESULT ENCOUNTER NOTE
Please notify pt of abnormal result: +HSV 2 on culture  Rec pt start valtrex 1 gm bid x 10 days  Rx sent to pt's pharmacy  Pt needs f/u appointment to discuss

## 2023-01-18 ENCOUNTER — OFFICE VISIT (OUTPATIENT)
Dept: OBGYN CLINIC | Facility: MEDICAL CENTER | Age: 33
End: 2023-01-18

## 2023-01-18 VITALS — WEIGHT: 124 LBS | HEIGHT: 63 IN | BODY MASS INDEX: 21.97 KG/M2

## 2023-01-18 DIAGNOSIS — Z20.2 POSSIBLE EXPOSURE TO STD: ICD-10-CM

## 2023-01-18 DIAGNOSIS — A60.04 HERPES SIMPLEX VULVOVAGINITIS: Primary | ICD-10-CM

## 2023-01-18 RX ORDER — VALACYCLOVIR HYDROCHLORIDE 500 MG/1
500 TABLET, FILM COATED ORAL DAILY
Qty: 3 TABLET | Refills: 3 | Status: SHIPPED | OUTPATIENT
Start: 2023-01-18 | End: 2023-04-18

## 2023-01-18 NOTE — PROGRESS NOTES
Assessment Kim Orr was seen today for follow-up  Diagnoses and all orders for this visit:    Herpes simplex vulvovaginitis  -     valACYclovir (VALTREX) 500 mg tablet; Take 1 tablet (500 mg total) by mouth daily  -     Herpes I/II IgG GA w Reflex to HSV-2; Future    Possible exposure to STD  -     HIV 1/2 AG/AB w Reflex [facility] for 2 yr old and above; Future  -     RPR; Future  -     Hepatitis panel, acute; Future         Plan  Reviewed culture results which were positive for HSV  Patient desires confirmation with blood work  Patient given lab slip for HSV 1 and 2 IgG  Patient also given lab slip for other STD testing including HIV, RPR and hepatitis  Discussed treatment with Valtrex which can either be taken with an outbreak or if patient wants to take it continuously  Patient reports she is thinking of taking it daily initially but then may want to change to as needed  Patient given handout on herpes  She is overdue for her yearly and will return for this  All questions answered  Subjective   Avani Moctezuma is a 29 y o  female G0 here for a f/u visit  Patient reports that the area of blisters has completely resolved  Patient did finish out the Valtrex without any issues  Patient reports that she has been with her partner long distance for over a year  Since then, they have only been intimate 3 times  Patient does not recall any issues in the past with any skin lesions  Patient Active Problem List   Diagnosis   • Amenorrhea, secondary   • Genital lesion, female   • Vaginal discharge   • Family planning counseling   • Vaccine counseling   • Herpes simplex vulvovaginitis       Gynecologic History  Patient's last menstrual period was 01/04/2023  The current method of family planning is condoms always  No past medical history on file  No past surgical history on file    Family History   Problem Relation Age of Onset   • No Known Problems Mother    • No Known Problems Father    • Stomach cancer Maternal Grandfather    • Breast cancer Other      Social History     Socioeconomic History   • Marital status: Single     Spouse name: Not on file   • Number of children: Not on file   • Years of education: Not on file   • Highest education level: Not on file   Occupational History   • Not on file   Tobacco Use   • Smoking status: Never   • Smokeless tobacco: Never   Substance and Sexual Activity   • Alcohol use: Yes     Comment: occasionally   • Drug use: No   • Sexual activity: Not Currently     Partners: Male     Birth control/protection: Condom, OCP   Other Topics Concern   • Not on file   Social History Narrative   • Not on file     Social Determinants of Health     Financial Resource Strain: Not on file   Food Insecurity: Not on file   Transportation Needs: Not on file   Physical Activity: Not on file   Stress: Not on file   Social Connections: Not on file   Intimate Partner Violence: Not on file   Housing Stability: Not on file     No Known Allergies    Current Outpatient Medications:   •  valACYclovir (VALTREX) 500 mg tablet, Take 1 tablet (500 mg total) by mouth daily, Disp: 3 tablet, Rfl: 3  •  valACYclovir (VALTREX) 1,000 mg tablet, Take 1 tablet (1,000 mg total) by mouth 2 (two) times a day for 10 days, Disp: 20 tablet, Rfl: 0  •  valACYclovir (VALTREX) 1,000 mg tablet, Take 1 tablet (1,000 mg total) by mouth 2 (two) times a day for 10 days, Disp: 20 tablet, Rfl: 0    Review of Systems  Constitutional :no fever, feels well, no tiredness, no recent weight gain or loss  ENT: no ear ache, no loss of hearing, no nosebleeds or nasal discharge, no sore throat or hoarseness  Cardiovascular: no complaints of slow or fast heart beat, no chest pain, no palpitations, no leg claudication or lower extremity edema    Respiratory: no complaints of shortness of shortness of breath, no LOREDO  Breasts:no complaints of breast pain, breast lump, or nipple discharge  Gastrointestinal: no complaints of abdominal pain, constipation, nausea, vomiting, or diarrhea or bloody stools  Genitourinary : no complaints of dysuria, incontinence, pelvic pain, no dysmenorrhea, vaginal discharge or abnormal vaginal bleeding and as noted in HPI  Musculoskeletal: no complaints of arthralgia, no myalgia, no joint swelling or stiffness, no limb pain or swelling  Integumentary: no complaints of skin rash or lesion, itching or dry skin  Neurological: no complaints of headache, no confusion, no numbness or tingling, no dizziness or fainting     Objective     Ht 5' 3" (1 6 m)   Wt 56 2 kg (124 lb)   LMP 01/04/2023   BMI 21 97 kg/m²     General Appears stated age, cooperative, alert normal mood and affect   Psychiatric oriented to person, place and time  Mood and affect normal   Neck: normal, supple,trachea midline, no masses  Thyroid: normal, no thyromegaly   Heart: regular rate and rhythm, S1, S2 normal, no murmur, click, rub or gallop   Lungs: clear to auscultation bilaterally, no increased work of breathing or signs of respiratory distress   Breasts: normal, no dimpling or skin changes noted   Abdomen: soft, non-tender, without masses or organomegaly   Vulva: normal , no lesions   Vagina: normal , no lesions or dryness   Urethra: normal   Urethal meatus normal   Bladder Normal, soft, non-tender and no prolapse or masses appreciated   Cervix: normal, no palpable masses    Uterus: normal , non-tender, not enlarged, no palpable masses   Adnexa: normal, non-tender without fullness or masses   Lymphatic Palpation of lymph nodes in neck, axilla, groin and/or other locations: no lymphadenopathy or masses noted   Skin Normal skin turgor and no rashes    Palpation of skin and subcutaneous tissue normal

## 2023-01-18 NOTE — PATIENT INSTRUCTIONS
Genital Herpes Simplex   WHAT YOU NEED TO KNOW:   What is genital herpes? Genital herpes is a sexually transmitted infection (STI) that is caused by the herpes simplex virus (HSV)  It is spread through oral, vaginal, or anal sex  It may be spread even if you do not see blisters  It can also be spread to other areas of your body, including your eyes, by touching open blisters  If you are pregnant, it may be spread to your baby while he is still in your womb or during vaginal delivery  Unprotected sex or sex with multiple partners increases your risk for genital herpes  What are the signs and symptoms of genital herpes? The most common symptoms are blisters that appear on your genital area, thighs, or buttocks  The blisters will open, leak fluid, and then dry up (crust over)  Usually these sores will go away without leaving a scar  Other symptoms may include any of the following:  Redness, burning, itching, or tingling in your genital area    Fever or chills    Headache, body weakness, or muscle pains    Swollen lymph nodes in your groin    Sore throat or loss of appetite    Fluid or blood leaking from your vagina    Pain when you urinate    How is genital herpes diagnosed? Your healthcare provider will ask about your health history and examine you  He or she will need to know when your symptoms started  Tell your provider about any STIs you or your partners may have  You may also need any of the following:  Blood tests  may show the HSV  You may also have this test if you have no symptoms but have a partner with genital herpes  A fluid sample from a blister  may show the HSV  How is genital herpes treated? There is no cure for genital herpes  You may need any of the following:  Antivirals  may help decrease your symptoms  Numbing cream or ointment  may help decrease pain  NSAIDs , such as ibuprofen, help decrease swelling, pain, and fever  This medicine is available with or without a doctor's order  NSAIDs can cause stomach bleeding or kidney problems in certain people  If you take blood thinner medicine, always ask your healthcare provider if NSAIDs are safe for you  Always read the medicine label and follow directions  How can I manage my symptoms? Do the following to be more comfortable when your infection is active:  Keep the blisters clean and dry  Wash them with soap and warm water, and pat dry gently  Wear cotton underwear and loose clothing  This may help to keep the blisters dry and keep clothes from rubbing  Apply ice  on the area for 15 to 20 minutes every hour or as directed  Use an ice pack, or put crushed ice in a plastic bag  Cover it with a towel  Ice helps prevent tissue damage and decreases swelling and pain  Apply heat  on the area for 20 to 30 minutes every 2 hours for as many days as directed  A warm bath may also help  Heat helps decrease pain and muscle spasms  How can I prevent the spread of genital herpes? Use condoms  Use a latex condom when you have oral, genital, and anal sex  Use a new condom each time  Use a polyurethane condom if you are allergic to latex  Try not to touch your blisters  Wash your hands before and after you touch the area  Do not kiss anyone if you have blisters around your mouth  Do not breastfeed if you have blisters on your breast      Tell your partners  that you have genital herpes  Do not have sex until he or she knows that you have genital herpes  Ask your healthcare provider for ways to tell partners about your infection  Tell your healthcare providers  that you have genital herpes  If you are pregnant, your baby may need special monitoring  Inform your healthcare provider of your condition to avoid spreading the infection to your baby  Call 911 for any of the following: You have trouble breathing  You have a seizure  Your neck is stiff  You have trouble thinking clearly      When should I contact my healthcare provider? You have chills or a fever  You have painful blisters on your penis, vagina, anus, or mouth  Fluid or blood is coming out of your genitals  You have trouble urinating  You think you are pregnant and you are bleeding from your vagina  You have trouble chewing or swallowing  Your symptoms do not get better, or they get worse, even after treatment  You have questions or concerns about your condition or care  CARE AGREEMENT:   You have the right to help plan your care  Learn about your health condition and how it may be treated  Discuss treatment options with your healthcare providers to decide what care you want to receive  You always have the right to refuse treatment  The above information is an  only  It is not intended as medical advice for individual conditions or treatments  Talk to your doctor, nurse or pharmacist before following any medical regimen to see if it is safe and effective for you  © Copyright [employer] 2022 Information is for End User's use only and may not be sold, redistributed or otherwise used for commercial purposes  All illustrations and images included in CareNotes® are the copyrighted property of A D A M , Inc  or [address] [place] [address]  Thank you for your confidence in our team    We appreciate you and welcome your feedback  If you receive a survey from us, please take a few moments to let us know how we are doing     Sincerely,   Brandon Martinez MD

## 2023-02-03 DIAGNOSIS — A60.04 HERPES SIMPLEX VULVOVAGINITIS: ICD-10-CM

## 2023-02-20 RX ORDER — VALACYCLOVIR HYDROCHLORIDE 500 MG/1
500 TABLET, FILM COATED ORAL DAILY
Qty: 3 TABLET | Refills: 0 | Status: SHIPPED | OUTPATIENT
Start: 2023-02-20 | End: 2023-05-21

## 2023-03-12 DIAGNOSIS — A60.04 HERPES SIMPLEX VULVOVAGINITIS: ICD-10-CM

## 2023-03-13 RX ORDER — VALACYCLOVIR HYDROCHLORIDE 500 MG/1
500 TABLET, FILM COATED ORAL DAILY
Qty: 3 TABLET | Refills: 0 | Status: SHIPPED | OUTPATIENT
Start: 2023-03-13 | End: 2023-06-11

## 2023-03-31 DIAGNOSIS — A60.04 HERPES SIMPLEX VULVOVAGINITIS: ICD-10-CM

## 2023-04-03 RX ORDER — VALACYCLOVIR HYDROCHLORIDE 500 MG/1
500 TABLET, FILM COATED ORAL DAILY
Qty: 3 TABLET | Refills: 0 | Status: SHIPPED | OUTPATIENT
Start: 2023-04-03 | End: 2023-07-02

## 2023-04-27 DIAGNOSIS — A60.04 HERPES SIMPLEX VULVOVAGINITIS: ICD-10-CM

## 2023-04-27 RX ORDER — VALACYCLOVIR HYDROCHLORIDE 500 MG/1
500 TABLET, FILM COATED ORAL DAILY
Qty: 3 TABLET | Refills: 0 | Status: SHIPPED | OUTPATIENT
Start: 2023-04-27 | End: 2023-07-26

## 2023-05-11 DIAGNOSIS — A60.04 HERPES SIMPLEX VULVOVAGINITIS: ICD-10-CM

## 2023-05-11 RX ORDER — VALACYCLOVIR HYDROCHLORIDE 500 MG/1
500 TABLET, FILM COATED ORAL DAILY
Qty: 3 TABLET | Refills: 0 | Status: SHIPPED | OUTPATIENT
Start: 2023-05-11 | End: 2023-08-09

## 2023-06-13 DIAGNOSIS — A60.04 HERPES SIMPLEX VULVOVAGINITIS: ICD-10-CM

## 2023-06-14 RX ORDER — VALACYCLOVIR HYDROCHLORIDE 500 MG/1
500 TABLET, FILM COATED ORAL DAILY
Qty: 3 TABLET | Refills: 0 | Status: SHIPPED | OUTPATIENT
Start: 2023-06-14 | End: 2023-09-12

## 2023-07-29 DIAGNOSIS — A60.04 HERPES SIMPLEX VULVOVAGINITIS: ICD-10-CM

## 2023-08-01 RX ORDER — VALACYCLOVIR HYDROCHLORIDE 500 MG/1
500 TABLET, FILM COATED ORAL DAILY
Qty: 90 TABLET | Refills: 3 | Status: SHIPPED | OUTPATIENT
Start: 2023-08-01 | End: 2024-07-31

## 2023-10-18 NOTE — PROGRESS NOTES
A/P    1. Annual exam    Last PAP - 4/16/21- neg neg    Next due 2026   Scheduling of pap discussed in detail     2. Previous STD    Retest at pt request   HSV positive culture - 1/4/23   Pt has very serious concerns and questions   We went over the testing with blood we discussed the antibodies and how they come and why they come and stay positive for every   We discussed partner discussion and what needs to be said and some way to discussed  She is considering oxygen infusion in [place]  We disused that the Ab umber will decrease over time and that might not need to due that and could still decrease   Went over that I expect them to be high since she just got first outbreak in feb 33 y.o.,No LMP recorded. C/O as detailed above  She has no new complaints. Past medical / social / surgical / family history reviewed and updated   Medication and allergies discussed in detail and updated   Review of Systems - History obtained from chart review and the patient  General ROS: negative  Psychological ROS: negative  Ophthalmic ROS: negative  ENT ROS: negative  Allergy and Immunology ROS: negative  Hematological and Lymphatic ROS: negative  Endocrine ROS: negative  Breast ROS: negative for breast lumps  Respiratory ROS: no cough, shortness of breath, or wheezing  Cardiovascular ROS: no chest pain or dyspnea on exertion  Gastrointestinal ROS: no abdominal pain, change in bowel habits, or black or bloody stools  Genito-Urinary ROS: no dysuria, trouble voiding, or hematuria  Musculoskeletal ROS: negative  Neurological ROS: no TIA or stroke symptoms  Dermatological ROS: negative        Physical Exam  Vitals reviewed. Exam conducted with a chaperone present. Constitutional:       Appearance: She is well-developed. Neck:      Thyroid: No thyromegaly. Cardiovascular:      Rate and Rhythm: Normal rate. Heart sounds: Normal heart sounds.    Pulmonary:      Effort: Pulmonary effort is normal. No accessory muscle usage or respiratory distress. Breath sounds: Normal air entry. Chest:      Chest wall: No tenderness. Breasts:     Breasts are symmetrical.      Right: No inverted nipple, mass or tenderness. Left: No inverted nipple, mass or tenderness. Abdominal:      General: There is no distension. Palpations: Abdomen is soft. Tenderness: There is no abdominal tenderness. There is no right CVA tenderness, left CVA tenderness, guarding or rebound. Genitourinary:     General: Normal vulva. Exam position: Lithotomy position. Labia:         Right: No rash, tenderness, lesion or injury. Left: No rash, tenderness, lesion or injury. Vagina: Normal. No foreign body. No vaginal discharge or bleeding. Cervix: Normal.      Uterus: Normal. Not enlarged and not fixed. Adnexa: Right adnexa normal and left adnexa normal.        Right: No mass, tenderness or fullness. Left: No mass, tenderness or fullness. Rectum: No external hemorrhoid. Musculoskeletal:      Cervical back: Normal range of motion. Lymphadenopathy:      Cervical: No cervical adenopathy. Upper Body:      Right upper body: No supraclavicular adenopathy. Left upper body: No supraclavicular adenopathy. Neurological:      Mental Status: She is alert and oriented to person, place, and time. Psychiatric:         Speech: Speech normal.         Behavior: Behavior normal.         Thought Content:  Thought content normal.         Judgment: Judgment normal.

## 2023-10-19 ENCOUNTER — ANNUAL EXAM (OUTPATIENT)
Dept: OBGYN CLINIC | Facility: MEDICAL CENTER | Age: 33
End: 2023-10-19
Payer: COMMERCIAL

## 2023-10-19 VITALS — HEIGHT: 63 IN | WEIGHT: 124.5 LBS | BODY MASS INDEX: 22.06 KG/M2

## 2023-10-19 DIAGNOSIS — Z20.2 POSSIBLE EXPOSURE TO STD: ICD-10-CM

## 2023-10-19 DIAGNOSIS — Z01.419 WOMEN'S ANNUAL ROUTINE GYNECOLOGICAL EXAMINATION: Primary | ICD-10-CM

## 2023-10-19 PROCEDURE — 87591 N.GONORRHOEAE DNA AMP PROB: CPT | Performed by: OBSTETRICS & GYNECOLOGY

## 2023-10-19 PROCEDURE — 87491 CHLMYD TRACH DNA AMP PROBE: CPT | Performed by: OBSTETRICS & GYNECOLOGY

## 2023-10-19 PROCEDURE — S0612 ANNUAL GYNECOLOGICAL EXAMINA: HCPCS | Performed by: OBSTETRICS & GYNECOLOGY

## 2023-10-19 RX ORDER — BENZOYL PEROXIDE 50 MG/ML
LIQUID TOPICAL
COMMUNITY
Start: 2023-08-07

## 2023-10-20 LAB
C TRACH DNA SPEC QL NAA+PROBE: NEGATIVE
N GONORRHOEA DNA SPEC QL NAA+PROBE: NEGATIVE

## 2023-10-23 DIAGNOSIS — B00.9 HSV (HERPES SIMPLEX VIRUS) INFECTION: ICD-10-CM

## 2023-10-23 RX ORDER — VALACYCLOVIR HYDROCHLORIDE 1 G/1
1000 TABLET, FILM COATED ORAL 2 TIMES DAILY
Qty: 20 TABLET | Refills: 0 | Status: SHIPPED | OUTPATIENT
Start: 2023-10-23 | End: 2023-10-23 | Stop reason: SDUPTHER

## 2023-10-24 RX ORDER — VALACYCLOVIR HYDROCHLORIDE 1 G/1
1000 TABLET, FILM COATED ORAL 2 TIMES DAILY
Qty: 20 TABLET | Refills: 0 | Status: SHIPPED | OUTPATIENT
Start: 2023-10-24 | End: 2023-11-03

## 2024-07-23 DIAGNOSIS — A60.04 HERPES SIMPLEX VULVOVAGINITIS: ICD-10-CM

## 2024-07-23 RX ORDER — VALACYCLOVIR HYDROCHLORIDE 500 MG/1
500 TABLET, FILM COATED ORAL DAILY
Qty: 90 TABLET | Refills: 1 | Status: SHIPPED | OUTPATIENT
Start: 2024-07-23 | End: 2025-01-19

## 2025-01-24 DIAGNOSIS — A60.04 HERPES SIMPLEX VULVOVAGINITIS: ICD-10-CM

## 2025-01-24 RX ORDER — VALACYCLOVIR HYDROCHLORIDE 500 MG/1
500 TABLET, FILM COATED ORAL DAILY
Qty: 90 TABLET | Refills: 0 | OUTPATIENT
Start: 2025-01-24 | End: 2025-07-23

## 2025-01-25 DIAGNOSIS — A60.04 HERPES SIMPLEX VULVOVAGINITIS: ICD-10-CM

## 2025-01-27 ENCOUNTER — TELEPHONE (OUTPATIENT)
Dept: OTHER | Facility: OTHER | Age: 35
End: 2025-01-27

## 2025-01-28 ENCOUNTER — TELEPHONE (OUTPATIENT)
Age: 35
End: 2025-01-28

## 2025-01-28 DIAGNOSIS — A60.04 HERPES SIMPLEX VULVOVAGINITIS: ICD-10-CM

## 2025-01-28 RX ORDER — VALACYCLOVIR HYDROCHLORIDE 500 MG/1
500 TABLET, FILM COATED ORAL DAILY
Qty: 90 TABLET | Refills: 0 | Status: SHIPPED | OUTPATIENT
Start: 2025-01-28 | End: 2025-07-27

## 2025-01-28 RX ORDER — VALACYCLOVIR HYDROCHLORIDE 500 MG/1
500 TABLET, FILM COATED ORAL DAILY
Qty: 90 TABLET | Refills: 1 | OUTPATIENT
Start: 2025-01-28 | End: 2025-07-27

## 2025-01-28 NOTE — TELEPHONE ENCOUNTER
Patient called to schedule her yearly exam. Please call patient back between these hours, 8 am to 10:00 am or  2:00 pm to 4:00 pm tomorrow to schedule.

## 2025-01-28 NOTE — TELEPHONE ENCOUNTER
Called patient at 8:20, M & sent MyChart message for the patient to call the office back to schedule her annual exam.

## 2025-03-19 ENCOUNTER — ANNUAL EXAM (OUTPATIENT)
Dept: OBGYN CLINIC | Facility: MEDICAL CENTER | Age: 35
End: 2025-03-19
Payer: COMMERCIAL

## 2025-03-19 VITALS
WEIGHT: 124.5 LBS | SYSTOLIC BLOOD PRESSURE: 120 MMHG | DIASTOLIC BLOOD PRESSURE: 70 MMHG | BODY MASS INDEX: 22.06 KG/M2 | HEIGHT: 63 IN

## 2025-03-19 DIAGNOSIS — Z12.4 PAP SMEAR FOR CERVICAL CANCER SCREENING: ICD-10-CM

## 2025-03-19 DIAGNOSIS — Z01.419 ENCOUNTER FOR ROUTINE GYNECOLOGICAL EXAMINATION WITH PAPANICOLAOU SMEAR OF CERVIX: Primary | ICD-10-CM

## 2025-03-19 PROCEDURE — G0476 HPV COMBO ASSAY CA SCREEN: HCPCS | Performed by: OBSTETRICS & GYNECOLOGY

## 2025-03-19 PROCEDURE — G0145 SCR C/V CYTO,THINLAYER,RESCR: HCPCS | Performed by: OBSTETRICS & GYNECOLOGY

## 2025-03-19 PROCEDURE — S0612 ANNUAL GYNECOLOGICAL EXAMINA: HCPCS | Performed by: OBSTETRICS & GYNECOLOGY

## 2025-03-19 NOTE — PROGRESS NOTES
ASSESSMENT & PLAN: Roxie was seen today for gynecologic exam.    Diagnoses and all orders for this visit:    Encounter for routine gynecological examination with Papanicolaou smear of cervix    Pap smear for cervical cancer screening  -     Liquid-based pap, screening          1.  Routine well woman exam done today  2.  Pap and HPV:  Patient's pap is not current.  Pap and cotesting was done today.  Current ASCCP Guidelines reviewed.   3.  Patient has not had her Gardasil vaccination.  Recommendations reviewed.  4.  The following were reviewed in today's visit: adequate intake of calcium and vitamin D, exercise, and healthy diet.  5.  F/u in 1 year for next routine GYN exam.  6.  Patient given contact information for [facility] to discuss fertility options.    CC:  Annual Gynecologic Examination    HPI: Roxie Andrews is a 34 y.o. who presents for annual gynecologic examination.  She has the following concerns:  pt reports she menses are regular and last 3-4 days long.  Menses q27-28 days.  Patient reports at the age of 26 she tried to donate her eggs and was told she had a low egg count.    Health Maintenance:    Patient reports her health to be good.  She does not have weight concerns.  She exercises 4 days per week with stair master and swimming and running.    She does wear her seatbelt routinely.    She does perform regular monthly self breast exams.    She does feels safe at home.   Patients does follow a healthy diet.  She gets 1 servings of dairy or calcium rich foods a day.    Patient Active Problem List   Diagnosis    Amenorrhea, secondary    Genital lesion, female    Vaginal discharge    Family planning counseling    Vaccine counseling    Herpes simplex vulvovaginitis       History reviewed. No pertinent past medical history.    History reviewed. No pertinent surgical history.    Past OB/Gyn History:  Pt does not have menstrual issues.      History of sexually transmitted infection: Yes.  HSV.  H/o chl  History of  abnormal pap smears: Yes >8 years ago.  Last pap 2021.    Patient is not currently sexually active.  heterosexual.   The current method of family planning is none.    Family History   Problem Relation Age of Onset    No Known Problems Mother     No Known Problems Father     Stomach cancer Maternal Grandfather     Breast cancer Other        Social History:  Social History     Socioeconomic History    Marital status: Single     Spouse name: Not on file    Number of children: Not on file    Years of education: Not on file    Highest education level: Not on file   Occupational History    Not on file   Tobacco Use    Smoking status: Never    Smokeless tobacco: Never   Vaping Use    Vaping status: Never Used   Substance and Sexual Activity    Alcohol use: Yes     Comment: occasionally    Drug use: No    Sexual activity: Not Currently     Partners: Male     Birth control/protection: Condom   Other Topics Concern    Not on file   Social History Narrative    Not on file     Social Drivers of Health     Financial Resource Strain: Not on file   Food Insecurity: Not on file   Transportation Needs: Not on file   Physical Activity: Not on file   Stress: Not on file   Social Connections: Not on file   Intimate Partner Violence: Not on file   Housing Stability: Not on file     Presently lives alone.  Patient is currently employed .    No Known Allergies    Current Outpatient Medications:     valACYclovir (VALTREX) 500 mg tablet, Take 1 tablet (500 mg total) by mouth daily, Disp: 90 tablet, Rfl: 0      Review of Systems  Constitutional :no fever, feels well, no tiredness, no recent weight gain or loss  ENT: no ear ache, no loss of hearing, no nosebleeds or nasal discharge, no sore throat or hoarseness.  Cardiovascular: no complaints of slow or fast heart beat, no chest pain, no palpitations, no leg claudication or lower extremity edema.  Respiratory: no complaints of shortness of shortness of breath, no  "LOREDO  Breasts:no complaints of breast pain, breast lump, or nipple discharge  Gastrointestinal: no complaints of abdominal pain, constipation, nausea, vomiting, or diarrhea or bloody stools  Genitourinary : no complaints of dysuria, incontinence, pelvic pain, no dysmenorrhea, vaginal discharge or abnormal vaginal bleeding and as noted in HPI.  Musculoskeletal: no complaints of arthralgia, no myalgia, no joint swelling or stiffness, no limb pain or swelling.  Integumentary: no complaints of skin rash or lesion, itching or dry skin  Neurological: no complaints of headache, no confusion, no numbness or tingling, no dizziness or fainting    Physical Exam:   /70   Ht 5' 3\" (1.6 m)   Wt 56.5 kg (124 lb 8 oz)   LMP 03/17/2025 (Exact Date)   BMI 22.05 kg/m²     General: appears stated age, cooperative, alert normal mood and affect   Psychiatric oriented to person, place and time.  Mood and affect normal   Neck: normal, supple,trachea midline, no masses.  Thyroid: normal, no thyromegaly   Heart: regular rate and rhythm, S1, S2 normal, no murmur, click, rub or gallop   Lungs: clear to auscultation bilaterally, no increased work of breathing or signs of respiratory distress   Breasts: normal, no dimpling or skin changes noted   Abdomen: soft, non-tender, without masses or organomegaly   Vulva: normal , no lesions   Vagina: normal , no lesions or dryness, sm amt of dark blood noted   Urethra: normal   Urethal meatus normal   Bladder Normal, soft, non-tender and no prolapse or masses appreciated   Cervix: normal, no palpable masses    Uterus: normal , non-tender, not enlarged, no palpable masses   Adnexa: normal, non-tender without fullness or masses   Lymphatic Palpation of lymph nodes in neck, axilla, groin and/or other locations: no lymphadenopathy or masses noted   Skin Normal skin turgor and no rashes.  Palpation of skin and subcutaneous tissue normal.      "

## 2025-03-25 ENCOUNTER — RESULTS FOLLOW-UP (OUTPATIENT)
Dept: OBGYN CLINIC | Facility: CLINIC | Age: 35
End: 2025-03-25

## 2025-03-25 LAB
LAB AP GYN PRIMARY INTERPRETATION: NORMAL
Lab: NORMAL

## 2025-04-26 DIAGNOSIS — A60.04 HERPES SIMPLEX VULVOVAGINITIS: ICD-10-CM

## 2025-04-28 RX ORDER — VALACYCLOVIR HYDROCHLORIDE 500 MG/1
500 TABLET, FILM COATED ORAL DAILY
Qty: 90 TABLET | Refills: 0 | OUTPATIENT
Start: 2025-04-28 | End: 2025-10-25

## 2025-04-28 RX ORDER — VALACYCLOVIR HYDROCHLORIDE 500 MG/1
500 TABLET, FILM COATED ORAL DAILY
Qty: 90 TABLET | Refills: 1 | Status: SHIPPED | OUTPATIENT
Start: 2025-04-28 | End: 2025-10-25

## 2025-06-17 ENCOUNTER — TELEPHONE (OUTPATIENT)
Dept: OBGYN CLINIC | Facility: MEDICAL CENTER | Age: 35
End: 2025-06-17

## 2025-06-17 ENCOUNTER — NURSE TRIAGE (OUTPATIENT)
Age: 35
End: 2025-06-17

## 2025-06-17 DIAGNOSIS — Z11.3 SCREENING FOR STD (SEXUALLY TRANSMITTED DISEASE): Primary | ICD-10-CM

## 2025-06-17 NOTE — TELEPHONE ENCOUNTER
Pt called with complaint of yellowish discharge,pt asked if provider can send Rx in for treatment,pt did say she had Sexual intercourse and was worried I did advise that we can order STD testing and she can have that done & provider will review.
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
